# Patient Record
Sex: MALE | Race: WHITE | Employment: FULL TIME | URBAN - NONMETROPOLITAN AREA
[De-identification: names, ages, dates, MRNs, and addresses within clinical notes are randomized per-mention and may not be internally consistent; named-entity substitution may affect disease eponyms.]

---

## 2019-03-18 LAB — PATHOLOGY REPORT: NORMAL

## 2019-03-19 LAB — SURGICAL PATHOLOGY REPORT: NORMAL

## 2019-04-05 ENCOUNTER — OFFICE VISIT (OUTPATIENT)
Dept: SURGERY | Age: 54
End: 2019-04-05

## 2019-04-05 VITALS
HEART RATE: 80 BPM | DIASTOLIC BLOOD PRESSURE: 80 MMHG | TEMPERATURE: 97.4 F | SYSTOLIC BLOOD PRESSURE: 130 MMHG | WEIGHT: 234 LBS | BODY MASS INDEX: 32.76 KG/M2 | HEIGHT: 71 IN

## 2019-04-05 DIAGNOSIS — Z48.89 ENCOUNTER FOR POSTOPERATIVE CARE: Primary | ICD-10-CM

## 2019-04-05 PROCEDURE — 99024 POSTOP FOLLOW-UP VISIT: CPT | Performed by: SURGERY

## 2019-04-05 RX ORDER — SIMVASTATIN 20 MG
20 TABLET ORAL NIGHTLY
COMMUNITY
End: 2021-08-03

## 2019-04-05 RX ORDER — CHLORAL HYDRATE 500 MG
1200 CAPSULE ORAL DAILY
COMMUNITY

## 2019-04-05 RX ORDER — TAMSULOSIN HYDROCHLORIDE 0.4 MG/1
1 CAPSULE ORAL DAILY
Refills: 0 | COMMUNITY
Start: 2019-03-28

## 2019-04-05 SDOH — HEALTH STABILITY: MENTAL HEALTH: HOW OFTEN DO YOU HAVE A DRINK CONTAINING ALCOHOL?: 2-3 TIMES A WEEK

## 2019-04-05 SDOH — HEALTH STABILITY: MENTAL HEALTH: HOW MANY STANDARD DRINKS CONTAINING ALCOHOL DO YOU HAVE ON A TYPICAL DAY?: 3 OR 4

## 2019-08-22 ENCOUNTER — HOSPITAL ENCOUNTER (OUTPATIENT)
Dept: GENERAL RADIOLOGY | Age: 54
Discharge: HOME | End: 2019-08-24
Payer: COMMERCIAL

## 2019-08-22 DIAGNOSIS — M25.522 LEFT ELBOW PAIN: ICD-10-CM

## 2019-08-22 PROCEDURE — 73080 X-RAY EXAM OF ELBOW: CPT

## 2020-08-21 ENCOUNTER — HOSPITAL ENCOUNTER (OUTPATIENT)
Dept: GENERAL RADIOLOGY | Age: 55
Discharge: HOME | End: 2020-08-23
Payer: COMMERCIAL

## 2020-08-21 PROCEDURE — 73630 X-RAY EXAM OF FOOT: CPT

## 2021-02-24 ENCOUNTER — HOSPITAL ENCOUNTER (OUTPATIENT)
Dept: MRI IMAGING | Age: 56
Discharge: HOME | End: 2021-02-26
Payer: COMMERCIAL

## 2021-02-24 DIAGNOSIS — M25.511 RIGHT SHOULDER PAIN, UNSPECIFIED CHRONICITY: ICD-10-CM

## 2021-02-24 DIAGNOSIS — M25.562 LEFT KNEE PAIN, UNSPECIFIED CHRONICITY: ICD-10-CM

## 2021-02-24 PROCEDURE — 73721 MRI JNT OF LWR EXTRE W/O DYE: CPT

## 2021-02-24 PROCEDURE — 73221 MRI JOINT UPR EXTREM W/O DYE: CPT

## 2021-08-03 ENCOUNTER — HOSPITAL ENCOUNTER (OUTPATIENT)
Dept: NON INVASIVE DIAGNOSTICS | Age: 56
Discharge: HOME | End: 2021-08-03
Payer: COMMERCIAL

## 2021-08-03 PROCEDURE — 93018 CV STRESS TEST I&R ONLY: CPT | Performed by: INTERNAL MEDICINE

## 2021-08-03 PROCEDURE — 93350 STRESS TTE ONLY: CPT

## 2021-08-03 RX ORDER — LOSARTAN POTASSIUM 50 MG/1
50 TABLET ORAL DAILY
COMMUNITY

## 2021-08-03 RX ORDER — ATORVASTATIN CALCIUM 10 MG/1
10 TABLET, FILM COATED ORAL DAILY
COMMUNITY

## 2021-08-03 RX ORDER — HYDROCHLOROTHIAZIDE 12.5 MG/1
12.5 CAPSULE, GELATIN COATED ORAL DAILY
COMMUNITY

## 2021-08-03 NOTE — PROGRESS NOTES
Date: 2021  Patient Name:  Juany Farias MRN:  [id number]  :  1965  Age:  64 y.o. Sex: male     Ordering Provider: Rubia Horta, FNP:   Primary Care Provider:  Gaviota Lomeli     Indications: Chest Pain     Medications:     Current Outpatient Medications:     losartan (COZAAR) 50 MG tablet, Take 50 mg by mouth daily, Disp: , Rfl:     hydroCHLOROthiazide (MICROZIDE) 12.5 MG capsule, Take 12.5 mg by mouth daily, Disp: , Rfl:     atorvastatin (LIPITOR) 10 MG tablet, Take 10 mg by mouth daily, Disp: , Rfl:     Omega-3 Fatty Acids (FISH OIL) 1000 MG CAPS, Take 1,200 mg by mouth daily, Disp: , Rfl:     tamsulosin (FLOMAX) 0.4 MG capsule, Take 1 capsule by mouth daily, Disp: , Rfl: 0     ------------------------------------------------------------------------------------------------    Predicted Heart Rate:  Maximum:  164   85%:  140     Heart Rate Restin   Standin     Blood Pressure Restin/80  Standin/80     Exercise Protocol Used:  GARETH    Exercise Duration/Stage:  9:46 (IV)     Test terminated due to: Fatigue  Imaging: ECHO     Maximum Heart Rate:  171 (104%) Max BP: 154/80  Max Workload: 11.30 METS     Chest Pain: No                     Onset:  NA  Severity:  NA     Electronically signed by Estefanía Cooper RN on 8/3/21 at 9:54 AM EDT    ----------------------------------------------------------------------------------------------  Baseline EKG :  NSR    EKG Changes:  None  Onset:  Leads:      Maximum Change:   Up sloping:    Horizontal:   Down sloping:       Leads with maximum changes:  None     EKG returned to baseline 1-2 minutes in recovery. Arrhythmias: None     Interpretation:  1. Normal ECG portion of stress test.  2. Normal echocardiographic portion of test with good augmentation and improvement in wall motion and ejection fraction from 55% at rest to 75% on stress. 3. Excellent functional capacity achieving 11.3 METS  4.  Low risk study      Supervising Physician: Richa Perry, DO DO

## 2023-06-29 ENCOUNTER — TELEPHONE (OUTPATIENT)
Dept: CARDIOLOGY | Age: 58
End: 2023-06-29

## 2024-08-30 ENCOUNTER — HOSPITAL ENCOUNTER (OUTPATIENT)
Dept: GENERAL RADIOLOGY | Age: 59
Discharge: HOME | End: 2024-08-30
Payer: COMMERCIAL

## 2024-08-30 DIAGNOSIS — M25.562 PAIN, JOINT, KNEE, LEFT: ICD-10-CM

## 2024-08-30 DIAGNOSIS — M25.562 PAIN IN JOINT OF LEFT KNEE: ICD-10-CM

## 2024-08-30 PROCEDURE — 73560 X-RAY EXAM OF KNEE 1 OR 2: CPT

## 2024-08-30 PROCEDURE — 73562 X-RAY EXAM OF KNEE 3: CPT

## 2025-05-28 ENCOUNTER — TRANSCRIBE ORDERS (OUTPATIENT)
Dept: GENERAL RADIOLOGY | Age: 60
End: 2025-05-28

## 2025-05-28 DIAGNOSIS — K45.8 OTHER SPECIFIED ABDOMINAL HERNIA WITHOUT OBSTRUCTION OR GANGRENE: Primary | ICD-10-CM

## 2025-05-29 PROBLEM — H35.413 BILATERAL RETINAL LATTICE DEGENERATION: Status: ACTIVE | Noted: 2024-02-06

## 2025-05-29 PROBLEM — M75.41 IMPINGEMENT SYNDROME OF RIGHT SHOULDER: Status: ACTIVE | Noted: 2021-03-16

## 2025-05-29 PROBLEM — G56.01 CARPAL TUNNEL SYNDROME OF RIGHT WRIST: Status: ACTIVE | Noted: 2024-10-17

## 2025-05-29 PROBLEM — Z48.89 ENCOUNTER FOR POSTOPERATIVE CARE: Status: ACTIVE | Noted: 2025-01-08

## 2025-05-29 PROBLEM — G47.33 OBSTRUCTIVE SLEEP APNEA SYNDROME: Status: ACTIVE | Noted: 2025-05-29

## 2025-05-29 PROBLEM — R20.2 PARESTHESIA: Status: ACTIVE | Noted: 2025-05-29

## 2025-05-29 PROBLEM — Z86.69 HISTORY OF DISORDER OF GLOBE OF EYE: Status: ACTIVE | Noted: 2024-02-06

## 2025-05-29 PROBLEM — H43.811 POSTERIOR VITREOUS DETACHMENT OF RIGHT EYE: Status: ACTIVE | Noted: 2024-02-06

## 2025-05-29 PROBLEM — S46.011A TRAUMATIC INCOMPLETE TEAR OF RIGHT ROTATOR CUFF: Status: ACTIVE | Noted: 2021-03-16

## 2025-05-29 PROBLEM — G89.18 POSTOPERATIVE PAIN: Status: ACTIVE | Noted: 2025-05-29

## 2025-05-29 PROBLEM — M77.11 LATERAL EPICONDYLITIS OF RIGHT ELBOW: Status: ACTIVE | Noted: 2024-08-22

## 2025-05-29 PROBLEM — R00.1 BRADYCARDIA: Status: ACTIVE | Noted: 2023-08-04

## 2025-05-29 PROBLEM — H33.311 RETINAL TEAR OF RIGHT EYE: Status: ACTIVE | Noted: 2024-02-06

## 2025-05-29 PROBLEM — Z98.890 HISTORY OF REPAIR OF RETINAL TEAR BY LASER PHOTOCOAGULATION: Status: ACTIVE | Noted: 2024-10-31

## 2025-05-29 PROBLEM — H25.13 AGE-RELATED NUCLEAR CATARACT OF BOTH EYES: Status: ACTIVE | Noted: 2024-02-06

## 2025-05-29 PROBLEM — M62.40 MUSCLE CONTRACTURE: Status: ACTIVE | Noted: 2022-04-15

## 2025-05-29 PROBLEM — H43.393 VITREOUS OPACITIES OF BOTH EYES: Status: ACTIVE | Noted: 2025-02-06

## 2025-05-29 PROBLEM — R52 PAIN: Status: ACTIVE | Noted: 2024-08-22

## 2025-05-29 RX ORDER — CYCLOBENZAPRINE HCL 10 MG
TABLET ORAL
COMMUNITY

## 2025-05-29 RX ORDER — OXYCODONE AND ACETAMINOPHEN 5; 325 MG/1; MG/1
TABLET ORAL
COMMUNITY
Start: 2024-10-02

## 2025-05-29 RX ORDER — ALBUTEROL SULFATE 90 UG/1
INHALANT RESPIRATORY (INHALATION)
COMMUNITY

## 2025-05-29 RX ORDER — LORATADINE 10 MG/1
TABLET ORAL
COMMUNITY

## 2025-05-29 RX ORDER — MONTELUKAST SODIUM 10 MG/1
TABLET ORAL
COMMUNITY

## 2025-05-29 RX ORDER — METHYLPREDNISOLONE 4 MG/1
TABLET ORAL
COMMUNITY

## 2025-05-29 RX ORDER — IBUPROFEN 800 MG/1
TABLET, FILM COATED ORAL
COMMUNITY

## 2025-05-29 RX ORDER — METHYLPREDNISOLONE 4 MG/1
TABLET ORAL
COMMUNITY
Start: 2024-07-08

## 2025-05-29 RX ORDER — NAPROXEN 500 MG/1
TABLET ORAL
COMMUNITY

## 2025-05-29 RX ORDER — FLUTICASONE PROPIONATE 50 MCG
SPRAY, SUSPENSION (ML) NASAL
COMMUNITY

## 2025-06-09 ENCOUNTER — HOSPITAL ENCOUNTER (OUTPATIENT)
Dept: CT IMAGING | Age: 60
Discharge: HOME | End: 2025-06-11
Payer: COMMERCIAL

## 2025-06-09 DIAGNOSIS — K45.8 OTHER SPECIFIED ABDOMINAL HERNIA WITHOUT OBSTRUCTION OR GANGRENE: ICD-10-CM

## 2025-06-09 PROCEDURE — 6360000004 HC RX CONTRAST MEDICATION: Performed by: NURSE PRACTITIONER

## 2025-06-09 PROCEDURE — 2709999900 CT ABDOMEN PELVIS W IV CONTRAST

## 2025-06-09 RX ORDER — IOPAMIDOL 755 MG/ML
100 INJECTION, SOLUTION INTRAVASCULAR
Status: COMPLETED | OUTPATIENT
Start: 2025-06-09 | End: 2025-06-09

## 2025-06-09 RX ADMIN — IOPAMIDOL 100 ML: 755 INJECTION, SOLUTION INTRAVENOUS at 11:19

## 2025-06-12 RX ORDER — MONTELUKAST SODIUM 10 MG/1
10 TABLET ORAL DAILY
COMMUNITY

## 2025-06-12 RX ORDER — IBUPROFEN 800 MG/1
800 TABLET, FILM COATED ORAL 3 TIMES DAILY
COMMUNITY

## 2025-06-12 RX ORDER — ALBUTEROL SULFATE 90 UG/1
2 INHALANT RESPIRATORY (INHALATION) EVERY 6 HOURS PRN
COMMUNITY

## 2025-06-18 ENCOUNTER — INITIAL CONSULT (OUTPATIENT)
Dept: SURGERY | Age: 60
End: 2025-06-18
Payer: COMMERCIAL

## 2025-06-18 VITALS
OXYGEN SATURATION: 95 % | HEIGHT: 71 IN | DIASTOLIC BLOOD PRESSURE: 84 MMHG | SYSTOLIC BLOOD PRESSURE: 132 MMHG | HEART RATE: 74 BPM | BODY MASS INDEX: 35.36 KG/M2 | WEIGHT: 252.6 LBS

## 2025-06-18 DIAGNOSIS — M62.08 DIASTASIS RECTI: Primary | ICD-10-CM

## 2025-06-18 PROCEDURE — 3079F DIAST BP 80-89 MM HG: CPT | Performed by: SURGERY

## 2025-06-18 PROCEDURE — 3075F SYST BP GE 130 - 139MM HG: CPT | Performed by: SURGERY

## 2025-06-18 PROCEDURE — 99204 OFFICE O/P NEW MOD 45 MIN: CPT | Performed by: SURGERY

## 2025-06-18 NOTE — PROGRESS NOTES
Within the past 12 months we worried whether our food would run out before we got money to buy more.: Never True     Within the past 12 months the food we bought just didn't last and we didn't have money to get more.: Never True   Transportation Needs: Not on file   Physical Activity: Not on file   Stress: Not on file   Social Connections: Not on file   Intimate Partner Violence: Not on file   Housing Stability: Not on file       ROS:   Review of Systems - Negative except as noted in HPI      Objective   Vitals:    06/18/25 1436   BP: 132/84   Pulse: 74   SpO2: 95%     General:in no apparent distress  Eyes: No gross abnormalities.  Ears, Nose, Throat: hearing grossly normal bilaterally  Neck: neck supple and non tender without mass  Lungs: clear to auscultation without wheezes or rales   Heart: S1S2, no mumurs, RRR  Abdomen: Soft, nontender, nondistended, bowel sounds present.  The patient does have a diastases in the upper abdomen above the umbilicus to the level of the xiphoid.  With this approximately 7 to 8 cm.  No hernias appreciated along the abdominal wall.  Extremity: negative  Neuro: CN II-XII grossly intact    CT abdomen pelvis images reviewed and read noted.    1. Diastasis recti  Assessment & Plan:  On exam the patient does have diastases recti of the upper abdomen.  I do not appreciate any hernia defects on exam and review of the CT does not show any evidence of abdominal wall hernias.  I had a discussion with him today about the diagnosis.  There is no danger to this though unfortunately there is not a lot that can be done for it.  I discussed with him that there are surgical procedures to correct for this but it would be considered a cosmetic procedure and would need to be performed by cosmetic surgeon.  We also discussed that strengthening of the abdominal core muscles could improve this but may not completely resolve the appearance of the diastases.  He voices understanding.  All questions were

## 2025-06-18 NOTE — ASSESSMENT & PLAN NOTE
On exam the patient does have diastases recti of the upper abdomen.  I do not appreciate any hernia defects on exam and review of the CT does not show any evidence of abdominal wall hernias.  I had a discussion with him today about the diagnosis.  There is no danger to this though unfortunately there is not a lot that can be done for it.  I discussed with him that there are surgical procedures to correct for this but it would be considered a cosmetic procedure and would need to be performed by cosmetic surgeon.  We also discussed that strengthening of the abdominal core muscles could improve this but may not completely resolve the appearance of the diastases.  He voices understanding.  All questions were answered.  No further follow-up with general surgery needed at this time.

## 2025-06-28 PROBLEM — R52 PAIN: Status: RESOLVED | Noted: 2024-08-22 | Resolved: 2025-06-28

## 2025-07-10 ENCOUNTER — TRANSCRIBE ORDERS (OUTPATIENT)
Dept: GENERAL RADIOLOGY | Age: 60
End: 2025-07-10

## 2025-07-10 DIAGNOSIS — M25.512 LEFT SHOULDER PAIN, UNSPECIFIED CHRONICITY: Primary | ICD-10-CM

## 2025-07-25 ENCOUNTER — HOSPITAL ENCOUNTER (OUTPATIENT)
Dept: MRI IMAGING | Age: 60
Discharge: HOME | End: 2025-07-27
Payer: COMMERCIAL

## 2025-07-25 DIAGNOSIS — M25.512 LEFT SHOULDER PAIN, UNSPECIFIED CHRONICITY: ICD-10-CM

## 2025-07-25 PROCEDURE — 73221 MRI JOINT UPR EXTREM W/O DYE: CPT
